# Patient Record
Sex: FEMALE | Race: WHITE
[De-identification: names, ages, dates, MRNs, and addresses within clinical notes are randomized per-mention and may not be internally consistent; named-entity substitution may affect disease eponyms.]

---

## 2017-08-28 ENCOUNTER — HOSPITAL ENCOUNTER (OUTPATIENT)
Dept: HOSPITAL 80 - BMCIMAGING | Age: 57
End: 2017-08-28
Attending: INTERNAL MEDICINE
Payer: COMMERCIAL

## 2017-08-28 DIAGNOSIS — Z13.820: Primary | ICD-10-CM

## 2017-08-28 DIAGNOSIS — M85.80: ICD-10-CM

## 2017-08-28 PROCEDURE — G0202 SCR MAMMO BI INCL CAD: HCPCS

## 2018-06-08 ENCOUNTER — HOSPITAL ENCOUNTER (INPATIENT)
Dept: HOSPITAL 80 - FED | Age: 58
LOS: 1 days | Discharge: HOME | DRG: 390 | End: 2018-06-09
Attending: SURGERY | Admitting: SURGERY
Payer: COMMERCIAL

## 2018-06-08 DIAGNOSIS — K56.600: Primary | ICD-10-CM

## 2018-06-08 DIAGNOSIS — Z85.43: ICD-10-CM

## 2018-06-08 LAB — PLATELET # BLD: 342 10^3/UL (ref 150–400)

## 2018-06-08 RX ADMIN — HYDROMORPHONE HYDROCHLORIDE PRN MG: 1 INJECTION, SOLUTION INTRAMUSCULAR; INTRAVENOUS; SUBCUTANEOUS at 15:33

## 2018-06-08 RX ADMIN — SODIUM CHLORIDE SCH MLS: 900 INJECTION, SOLUTION INTRAVENOUS at 12:23

## 2018-06-08 RX ADMIN — SODIUM CHLORIDE SCH MLS: 900 INJECTION, SOLUTION INTRAVENOUS at 21:10

## 2018-06-08 NOTE — EDPHY
HPI/HX/ROS/PE/MDM


Narrative: 





CHIEF COMPLAINT: Vomiting, abdominal discomfort





HPI: The patient is a 58 y/o female with a history of a small bowel obstruction 

and ovarian cancer post resection arriving with her  complaining of 

waxing and waning vomiting onset last night. She began to feel poor and 

nauseated yesterday while at work and symptoms worsened through the night. She 

feels worse this morning and has associated abdominal discomfort. She has been 

sipping water, but eating very little. She has not had a bowel movement since 

symptoms began. Symptoms feel similar to the early stages of a prior small 

bowel obstruction. That episode was treated successfully with an NG tube. She 

notes she had what she thinks was the norovirus 2 weeks ago after traveling 

with fever and diarrhea. 





REVIEW OF SYSTEMS:


Aside from elements discussed in the HPI, a comprehensive 10-point review of 

systems was reviewed and is negative.





PMH: 


1. Partial small bowel obstruction 12/5/15 - treated with NG and supportive care


2. Ovarian cancer post oophorectomy and radiation





SOCIAL HISTORY: Works as .  at bedside. 





Prior medical records reviewed including admission 12/5/15 for SBO. 





PHYSICAL EXAM:


General:Patient is alert, in no acute distress.


ENT:Eyes are normal to inspection.  ENT inspection normal.


Neck: Normal inspection.  Full range of motion.


Respiratory:No respiratory distress.  Breath sounds normal bilaterally.


Cardiovascular: Regular rate and rhythm.  Strong peripheral pulses.  Normal cap 

refill.


Abdomen:The abdomen is nontender to palpation. There are no peritoneal signs.


Back: Normal to inspection.  No tenderness to palpation.


Skin: Normal color.  No rash.  Warm and dry.


Extremities: Normal appearance.  Full range of motion.


Neuro: Oriented x3.  Normal motor function.  Normal sensory function.


ED Course: 





This is a 58 y/o female with a history of a prior SBO and oophorectomy for 

ovarian cancer who presents with a 1-day history of progressive nausea, vomiting

, and abdominal discomfort that feels similar to her prior SBO. She has not had 

a bowel movement since symptoms began. Her abdomen is benign on exam. Plan for 

IV, labs, UA, and abdominal x-ray. 1L IV NS and 4mg IV Zofran administered for 

symptoms. 





X-ray shows nonspecific loops of bowel. Abdominal CT ordered. 





CT shows high grade small bowel obstruction. Plan for NG tube. 





1039: Consulted with Dr. Marthaller, surgeon. He will assess patient in the ED. 


MDM: 





This patient presents with signs and symptoms of SBO, confirmed on CT.  She 

requires admission for further workup and treatment.  I see no signs of sepsis, 

bowel perforation, appendicitis or diverticulitis. 





- Data Points


Imaging Results: 


 Imaging Impressions





Abdomen X-Ray  06/08/18 08:56


Impression: Nonspecific sentinel loops left pelvis. Consider CT with IV 

contrast. 








Abdomen CT  06/08/18 09:34


Impression:


1. High-grade partial small bowel obstruction near the jejunal ileal junction.


2. Small volume ascites.


3. Moderate lumbar canal stenosis at L4/L5 due to chronic disk protrusion.


 


Findings were communicated by telephone with Dr. Michael Zaldivar MD at  6/8/ 2018 10:28











Imaging: Discussed imaging studies w/ On call Radiologist, I viewed and 

interpreted images myself


Laboratory Results: 


 Laboratory Results





 06/08/18 08:34 





 06/08/18 08:30 





 











  06/08/18 06/08/18 06/08/18





  08:50 08:34 08:30


 


WBC    10.98 10^3/uL H 10^3/uL  





    (3.80-9.50)  


 


RBC    5.03 10^6/uL 10^6/uL  





    (4.18-5.33)  


 


Hgb    14.9 g/dL g/dL  





    (12.6-16.3)  


 


Hct    42.7 % %  





    (38.0-47.0)  


 


MCV    84.9 fL fL  





    (81.5-99.8)  


 


MCH    29.6 pg pg  





    (27.9-34.1)  


 


MCHC    34.9 g/dL g/dL  





    (32.4-36.7)  


 


RDW    12.6 % %  





    (11.5-15.2)  


 


Plt Count    342 10^3/uL 10^3/uL  





    (150-400)  


 


MPV    9.0 fL fL  





    (8.7-11.7)  


 


Neut % (Auto)    78.5 % H %  





    (39.3-74.2)  


 


Lymph % (Auto)    14.0 % L %  





    (15.0-45.0)  


 


Mono % (Auto)    5.8 % %  





    (4.5-13.0)  


 


Eos % (Auto)    0.6 % %  





    (0.6-7.6)  


 


Baso % (Auto)    0.7 % %  





    (0.3-1.7)  


 


Nucleat RBC Rel Count    0.0 % %  





    (0.0-0.2)  


 


Absolute Neuts (auto)    8.61 10^3/uL H 10^3/uL  





    (1.70-6.50)  


 


Absolute Lymphs (auto)    1.54 10^3/uL 10^3/uL  





    (1.00-3.00)  


 


Absolute Monos (auto)    0.64 10^3/uL 10^3/uL  





    (0.30-0.80)  


 


Absolute Eos (auto)    0.07 10^3/uL 10^3/uL  





    (0.03-0.40)  


 


Absolute Basos (auto)    0.08 10^3/uL 10^3/uL  





    (0.02-0.10)  


 


Absolute Nucleated RBC    0.00 10^3/uL 10^3/uL  





    (0-0.01)  


 


Immature Gran %    0.4 % %  





    (0.0-1.1)  


 


Immature Gran #    0.04 10^3/uL 10^3/uL  





    (0.00-0.10)  


 


Sodium      129 mEq/L L mEq/L





     (135-145) 


 


Potassium      3.9 mEq/L mEq/L





     (3.3-5.0) 


 


Chloride      86 mEq/L L mEq/L





     () 


 


Carbon Dioxide      31 mEq/l mEq/l





     (22-31) 


 


Anion Gap      12 mEq/L mEq/L





     (8-16) 


 


BUN      16 mg/dL mg/dL





     (7-23) 


 


Creatinine      0.8 mg/dL mg/dL





     (0.6-1.0) 


 


Estimated GFR      > 60 





    


 


Glucose      111 mg/dL H mg/dL





     () 


 


Calcium      9.8 mg/dL mg/dL





     (8.5-10.4) 


 


Urine Color  YELLOW     





    


 


Urine Appearance  HAZY     





    


 


Urine pH  9.0  H     





   (5.0-7.5)   


 


Ur Specific Gravity  1.019     





   (1.002-1.030)   


 


Urine Protein  NEGATIVE     





   (NEGATIVE)   


 


Urine Ketones  TRACE  H     





   (NEGATIVE)   


 


Urine Blood  NEGATIVE     





   (NEGATIVE)   


 


Urine Nitrate  NEGATIVE     





   (NEGATIVE)   


 


Urine Bilirubin  NEGATIVE     





   (NEGATIVE)   


 


Urine Urobilinogen  NEGATIVE EU EU    





   (0.2-1.0)   


 


Ur Leukocyte Esterase  NEGATIVE     





   (NEGATIVE)   


 


Urine Glucose  NEGATIVE     





   (NEGATIVE)   











Medications Given: 


 








Discontinued Medications





Sodium Chloride (Ns)  1,000 mls @ 0 mls/hr IV ONCE ONE


   PRN Reason: Wide Open


   Stop: 06/08/18 08:36


   Last Admin: 06/08/18 08:46 Dose:  1,000 mls


Ondansetron HCl (Zofran)  4 mg IVP EDNOW ONE


   Stop: 06/08/18 08:36


   Last Admin: 06/08/18 08:48 Dose:  4 mg








General


Time Seen by Provider: 06/08/18 08:26


Initial Vital Signs: 


 Initial Vital Signs











Temperature (C)  36.5 C   06/08/18 08:04


 


Heart Rate  65   06/08/18 08:04


 


Respiratory Rate  18   06/08/18 08:04


 


Blood Pressure  126/66 H  06/08/18 08:04


 


O2 Sat (%)  97   06/08/18 08:04








 











O2 Delivery Mode               Room Air














Allergies/Adverse Reactions: 


 





No Known Allergies Allergy (Unverified 06/08/18 08:03)


 








Home Medications: 














 Medication  Instructions  Recorded


 


Estradiol [Estradiol 1 MG (*)] 0.5 mg PO DAILY 06/08/18


 


Herbals/Supplements -Info Only 1 ea PO DAILY 06/08/18


 


Ranitidine HCl [Zantac 75] 75 mg PO DAILY PRN 06/08/18


 


medroxyPROGESTERone [Provera 2.5 1.25 mg PO DAILY 06/08/18





mg (*)]  














Departure





- Departure


Disposition: [facility] Acute


Report Scribed for: Michael Zaldivar


Report Scribed by: Heena Reese


Date of Report: 06/08/18


Time of Report: 08:50


Physician Review and Approval Statement: Portions of this note were transcribed 

by an ED scribe.  I personally performed the history, physical exam, and 

medical decision making; and confirm the accuracy of the information in the 

transcribed note.

## 2018-06-08 NOTE — PDGENHP
History and Physical





- Chief Complaint


nausea, vomiting and abdominal pain 





- History of Present Illness


56yo female, remote Hx of ovarian Ca s/p total hyst, RPLND presents with acute 

onset of abdominal pain with nausea and vomiting. Per her report, awoke 

yesterday morning, didnt feel great. througout the morning states that the 

abdominal discomfort progressed to nausea with vomiting. She had a BM yesterday 

AM and was loose, since then has had no BM or flatus. The nausea and vomiting 

persisted which prompted her presentation here. Was hospitalized here in Dec of 

15 with SBO, treated conservatively. Has a Hx of ovarian Ca which she had 2 

surgeries for, she is unclear as to the extent of the surgeries but does note 

that she had radiation treatment after OR. Her main complaint is the nausea. 

Pain is colicky, crampy, 7/10, nonradiating, better with IV narcotics. 





History Information





- Allergies/Home Medication List


Allergies/Adverse Reactions: 








No Known Allergies Allergy (Unverified 06/08/18 08:03)


 





Home Medications: 








Estradiol [Estradiol 1 MG (*)] 0.5 mg PO DAILY 06/08/18 [Last Taken 06/07/18]


Herbals/Supplements -Info Only 1 ea PO DAILY 06/08/18 [Last Taken Unknown]


Ranitidine HCl [Zantac 75] 75 mg PO DAILY PRN 06/08/18 [Last Taken 06/07/18]


medroxyPROGESTERone [Provera 2.5 mg (*)] 1.25 mg PO DAILY 06/08/18 [Last Taken 

06/07/18]





I have personally reviewed and updated: family history, medical history, social 

history, surgical history


Past Medical History: ovarian Ca





- Surgical History


Additional surgical history: total hyst, RPLND, debulking?





- Family History


Positive for: non-pertinent





- Social History


Smoking Status: Never smoked


Additional social history: , lives here,  at bedside.





Review of Systems


Review of Systems: 





ROS: 10pt was reviewed & negative except for what was stated in HPI & below





Physical Exam


Physical Exam: 

















Temp Pulse Resp BP Pulse Ox


 


 36.7 C   59 L  16   138/72 H  99 


 


 06/08/18 11:57  06/08/18 11:57  06/08/18 11:57  06/08/18 11:57  06/08/18 11:57











Constitutional: no apparent distress, appears nourished, uncomfortable


Eyes: PERRL, anicteric sclera, EOMI


Ears, Nose, Mouth, Throat: hearing normal, ears appear normal, no oral mucosal 

ulcers, dry mucous membranes


Cardiovascular: regular rate and rhythym, no murmur, rub, or gallop, No edema


Respiratory: no respiratory distress, no rales or rhonchi, clear to auscultation


Gastrointestinal: other (hypoactive bowel sounds, soft, minimally tender, no 

rebound, well healed midline scar )


Genitourinary: no bladder fullness, no bladder tenderness


Skin: warm, normal color, no rashes or abrasions, no fluctuance, no induration, 

No mottled


Musculoskeletal: full muscle strength, no muscle tenderness, normal joint ROM, 

no joint effusions


Psychiatric: interacting appropriately, not anxious, not encephalopathic, 

thought process linear


Lymph, Heme, Immunologic: no cervical LAD, no supraclavicular LAD





Lab Data & Imaging Review





 06/08/18 08:34





 06/08/18 08:30














WBC  10.98 10^3/uL (3.80-9.50)  H  06/08/18  08:34    


 


RBC  5.03 10^6/uL (4.18-5.33)   06/08/18  08:34    


 


Hgb  14.9 g/dL (12.6-16.3)   06/08/18  08:34    


 


Hct  42.7 % (38.0-47.0)   06/08/18  08:34    


 


MCV  84.9 fL (81.5-99.8)   06/08/18  08:34    


 


MCH  29.6 pg (27.9-34.1)   06/08/18  08:34    


 


MCHC  34.9 g/dL (32.4-36.7)   06/08/18  08:34    


 


RDW  12.6 % (11.5-15.2)   06/08/18  08:34    


 


Plt Count  342 10^3/uL (150-400)   06/08/18  08:34    


 


MPV  9.0 fL (8.7-11.7)   06/08/18  08:34    


 


Neut % (Auto)  78.5 % (39.3-74.2)  H  06/08/18  08:34    


 


Lymph % (Auto)  14.0 % (15.0-45.0)  L  06/08/18  08:34    


 


Mono % (Auto)  5.8 % (4.5-13.0)   06/08/18  08:34    


 


Eos % (Auto)  0.6 % (0.6-7.6)   06/08/18  08:34    


 


Baso % (Auto)  0.7 % (0.3-1.7)   06/08/18  08:34    


 


Nucleat RBC Rel Count  0.0 % (0.0-0.2)   06/08/18  08:34    


 


Absolute Neuts (auto)  8.61 10^3/uL (1.70-6.50)  H  06/08/18  08:34    


 


Absolute Lymphs (auto)  1.54 10^3/uL (1.00-3.00)   06/08/18  08:34    


 


Absolute Monos (auto)  0.64 10^3/uL (0.30-0.80)   06/08/18  08:34    


 


Absolute Eos (auto)  0.07 10^3/uL (0.03-0.40)   06/08/18  08:34    


 


Absolute Basos (auto)  0.08 10^3/uL (0.02-0.10)   06/08/18  08:34    


 


Absolute Nucleated RBC  0.00 10^3/uL (0-0.01)   06/08/18  08:34    


 


Immature Gran %  0.4 % (0.0-1.1)   06/08/18  08:34    


 


Immature Gran #  0.04 10^3/uL (0.00-0.10)   06/08/18  08:34    


 


Sodium  129 mEq/L (135-145)  L  06/08/18  08:30    


 


Potassium  3.9 mEq/L (3.3-5.0)   06/08/18  08:30    


 


Chloride  86 mEq/L ()  L  06/08/18  08:30    


 


Carbon Dioxide  31 mEq/l (22-31)   06/08/18  08:30    


 


Anion Gap  12 mEq/L (8-16)   06/08/18  08:30    


 


BUN  16 mg/dL (7-23)   06/08/18  08:30    


 


Creatinine  0.8 mg/dL (0.6-1.0)   06/08/18  08:30    


 


Estimated GFR  > 60   06/08/18  08:30    


 


Glucose  111 mg/dL ()  H  06/08/18  08:30    


 


Calcium  9.8 mg/dL (8.5-10.4)   06/08/18  08:30    


 


Urine Color  YELLOW   06/08/18  08:50    


 


Urine Appearance  HAZY   06/08/18  08:50    


 


Urine pH  9.0  (5.0-7.5)  H  06/08/18  08:50    


 


Ur Specific Gravity  1.019  (1.002-1.030)   06/08/18  08:50    


 


Urine Protein  NEGATIVE  (NEGATIVE)   06/08/18  08:50    


 


Urine Ketones  TRACE  (NEGATIVE)  H  06/08/18  08:50    


 


Urine Blood  NEGATIVE  (NEGATIVE)   06/08/18  08:50    


 


Urine Nitrate  NEGATIVE  (NEGATIVE)   06/08/18  08:50    


 


Urine Bilirubin  NEGATIVE  (NEGATIVE)   06/08/18  08:50    


 


Urine Urobilinogen  NEGATIVE EU (0.2-1.0)   06/08/18  08:50    


 


Ur Leukocyte Esterase  NEGATIVE  (NEGATIVE)   06/08/18  08:50    


 


Urine Glucose  NEGATIVE  (NEGATIVE)   06/08/18  08:50    








Visualized and Interpreted imaging results: Yes


Interpretation: CT: SBO, transition noted, large fluid filled dilated stomach





Assessment & Plan


Assessment: 





56yo F Hx of ovarian Ca s/p debulking and radiation with SBO


Plan: 





admit to surgical service


- NPO, NGT, bowel rest


- her abdomen is currently soft and reassuring, will see how she does with 

decompression


- last admission did well with conservative Tx, will attempt that this 

admission. Did discuss with her that she may fail and need exploration.


- IV fluid hydration

## 2018-06-08 NOTE — ASMTCMCOM
CM Note

 

CM Note                       

Notes:

Chart reviewed. Patient is a 56 year old female with history significant for ovarian 

cancer, abdominal surgeries and past SBO that resolved with conservative treatment.



CM to follow.



Plan:TBD

 

Date Signed:  06/08/2018 04:07 PM

Electronically Signed By:Anupama Ibarra RN

## 2018-06-08 NOTE — PDMN
Medical Necessity


Medical necessity: MCG M210- Intestinal obstruction 2 days:  High grade partial 

sm. bowel obstruction .  NPO, NGT, bowel rest. further monitoring and tx req.

## 2018-06-09 VITALS — SYSTOLIC BLOOD PRESSURE: 109 MMHG | DIASTOLIC BLOOD PRESSURE: 61 MMHG

## 2018-06-09 RX ADMIN — HYDROMORPHONE HYDROCHLORIDE PRN MG: 1 INJECTION, SOLUTION INTRAMUSCULAR; INTRAVENOUS; SUBCUTANEOUS at 00:32

## 2018-06-09 NOTE — SOAPPROG
SOAP Progress Note


Assessment/Plan: 


Assessment:


57 FEMALE WITH PARTIAL SBO/  NO PAIN BUT MINIMAL FLATUS/  NG OUTPUT DECREASED/  

AFEBRILE


ABD SOFT, NONTENDER, + BS,  NONDISTENDED


CHEST CLEAR


COR RR


HEENT NONICTERIC








PT CONCERNED ABOUT RECURRENT SYMPTOMS SINCE SHE HAD THIS 3 YRS AGO














Plan:CHECK 2-WAY,  CONSIDER SBFT





06/09/18 09:54





Objective: 





 Vital Signs











Temp Pulse Resp BP Pulse Ox


 


 36.6 C   75   16   93/52 L  96 


 


 06/09/18 08:00  06/09/18 08:00  06/09/18 08:00  06/09/18 08:00  06/09/18 08:00








 











 06/08/18 06/09/18 06/10/18





 05:59 05:59 05:59


 


Intake Total  757 


 


Output Total  1900 


 


Balance  -1143 














ICD10 Worksheet


Patient Problems: 


 Problems











Problem Status Onset


 


Hypochloremia Acute  


 


Hypokalemia Acute  


 


Hyponatremia Acute  


 


Small bowel obstruction Acute

## 2018-06-09 NOTE — SOAPPROG
SOAP Progress Note


Assessment/Plan: 


Assessment:


57 FEMALE WITH PARTIAL SBO/  NO PAIN BUT MINIMAL FLATUS/  NG OUTPUT DECREASED/  

AFEBRILE


ABD SOFT, NONTENDER, + BS,  NONDISTENDED


CHEST CLEAR


COR RR


HEENT NONICTERIC








PT CONCERNED ABOUT RECURRENT SYMPTOMS SINCE SHE HAD THIS 3 YRS AGO














Plan:CHECK 2-WAY,  CONSIDER SBFT





06/09/18 09:54





06/09/18 21:25


multiple bms after normal sbft/  abd soft, nontender,nondistended/   risks and 

options fully discussed


plan  home, office f/u


Objective: 





 Vital Signs











Temp Pulse Resp BP Pulse Ox


 


 36.7 C   76   16   109/61   97 


 


 06/09/18 19:41  06/09/18 19:41  06/09/18 16:00  06/09/18 19:41  06/09/18 19:41








 











 06/08/18 06/09/18 06/10/18





 05:59 05:59 05:59


 


Intake Total  757 


 


Output Total  1560 1450


 


Balance  -1143 -1450














ICD10 Worksheet


Patient Problems: 


 Problems











Problem Status Onset


 


Hypochloremia Acute  


 


Hypokalemia Acute  


 


Hyponatremia Acute  


 


Small bowel obstruction Acute

## 2018-09-05 ENCOUNTER — HOSPITAL ENCOUNTER (OUTPATIENT)
Dept: HOSPITAL 80 - BMCIMAGING | Age: 58
End: 2018-09-05
Attending: NURSE PRACTITIONER
Payer: COMMERCIAL

## 2018-09-05 DIAGNOSIS — Z12.31: Primary | ICD-10-CM
